# Patient Record
Sex: FEMALE
[De-identification: names, ages, dates, MRNs, and addresses within clinical notes are randomized per-mention and may not be internally consistent; named-entity substitution may affect disease eponyms.]

---

## 2017-01-26 ENCOUNTER — APPOINTMENT (OUTPATIENT)
Dept: POPULATION HEALTH | Facility: CLINIC | Age: 62
End: 2017-01-26

## 2017-01-26 VITALS
DIASTOLIC BLOOD PRESSURE: 68 MMHG | TEMPERATURE: 98.3 F | SYSTOLIC BLOOD PRESSURE: 98 MMHG | BODY MASS INDEX: 19.99 KG/M2 | OXYGEN SATURATION: 98 % | HEIGHT: 65 IN | WEIGHT: 120 LBS | HEART RATE: 85 BPM

## 2017-06-01 ENCOUNTER — APPOINTMENT (OUTPATIENT)
Dept: POPULATION HEALTH | Facility: CLINIC | Age: 62
End: 2017-06-01

## 2017-06-01 VITALS
HEART RATE: 81 BPM | SYSTOLIC BLOOD PRESSURE: 100 MMHG | WEIGHT: 117 LBS | OXYGEN SATURATION: 98 % | TEMPERATURE: 98 F | BODY MASS INDEX: 19.49 KG/M2 | DIASTOLIC BLOOD PRESSURE: 60 MMHG | HEIGHT: 65 IN

## 2017-11-02 ENCOUNTER — APPOINTMENT (OUTPATIENT)
Dept: POPULATION HEALTH | Facility: CLINIC | Age: 62
End: 2017-11-02
Payer: OTHER MISCELLANEOUS

## 2017-11-02 VITALS
HEIGHT: 65 IN | HEART RATE: 87 BPM | DIASTOLIC BLOOD PRESSURE: 60 MMHG | OXYGEN SATURATION: 98 % | SYSTOLIC BLOOD PRESSURE: 100 MMHG | BODY MASS INDEX: 19.99 KG/M2 | TEMPERATURE: 97.9 F | WEIGHT: 120 LBS

## 2017-11-02 PROCEDURE — 99213 OFFICE O/P EST LOW 20 MIN: CPT

## 2018-04-05 ENCOUNTER — APPOINTMENT (OUTPATIENT)
Dept: POPULATION HEALTH | Facility: CLINIC | Age: 63
End: 2018-04-05
Payer: OTHER MISCELLANEOUS

## 2018-04-05 VITALS
DIASTOLIC BLOOD PRESSURE: 66 MMHG | HEART RATE: 88 BPM | WEIGHT: 125 LBS | TEMPERATURE: 97.9 F | BODY MASS INDEX: 20.83 KG/M2 | HEIGHT: 65 IN | SYSTOLIC BLOOD PRESSURE: 99 MMHG | OXYGEN SATURATION: 98 %

## 2018-04-05 PROCEDURE — 99213 OFFICE O/P EST LOW 20 MIN: CPT

## 2018-09-20 ENCOUNTER — APPOINTMENT (OUTPATIENT)
Dept: POPULATION HEALTH | Facility: CLINIC | Age: 63
End: 2018-09-20
Payer: OTHER MISCELLANEOUS

## 2018-09-20 VITALS
TEMPERATURE: 97.8 F | OXYGEN SATURATION: 98 % | BODY MASS INDEX: 20.66 KG/M2 | DIASTOLIC BLOOD PRESSURE: 64 MMHG | SYSTOLIC BLOOD PRESSURE: 110 MMHG | HEIGHT: 65 IN | WEIGHT: 124 LBS | HEART RATE: 77 BPM

## 2018-09-20 PROCEDURE — 99214 OFFICE O/P EST MOD 30 MIN: CPT

## 2018-09-20 RX ORDER — AMOXICILLIN 875 MG/1
875 TABLET, FILM COATED ORAL
Qty: 14 | Refills: 0 | Status: COMPLETED | COMMUNITY
Start: 2018-09-12

## 2019-01-17 ENCOUNTER — APPOINTMENT (OUTPATIENT)
Dept: POPULATION HEALTH | Facility: CLINIC | Age: 64
End: 2019-01-17
Payer: OTHER MISCELLANEOUS

## 2019-01-17 VITALS
DIASTOLIC BLOOD PRESSURE: 73 MMHG | SYSTOLIC BLOOD PRESSURE: 113 MMHG | HEIGHT: 65 IN | BODY MASS INDEX: 20.66 KG/M2 | OXYGEN SATURATION: 97 % | WEIGHT: 124 LBS | TEMPERATURE: 97.4 F | HEART RATE: 84 BPM

## 2019-01-17 PROCEDURE — 99214 OFFICE O/P EST MOD 30 MIN: CPT

## 2019-05-16 ENCOUNTER — APPOINTMENT (OUTPATIENT)
Dept: POPULATION HEALTH | Facility: CLINIC | Age: 64
End: 2019-05-16
Payer: OTHER MISCELLANEOUS

## 2019-05-16 VITALS
DIASTOLIC BLOOD PRESSURE: 73 MMHG | HEIGHT: 65 IN | OXYGEN SATURATION: 98 % | SYSTOLIC BLOOD PRESSURE: 112 MMHG | TEMPERATURE: 98.3 F | HEART RATE: 80 BPM

## 2019-05-16 PROCEDURE — 99215 OFFICE O/P EST HI 40 MIN: CPT

## 2019-09-12 ENCOUNTER — APPOINTMENT (OUTPATIENT)
Dept: POPULATION HEALTH | Facility: CLINIC | Age: 64
End: 2019-09-12
Payer: OTHER MISCELLANEOUS

## 2019-09-12 VITALS
TEMPERATURE: 97.6 F | HEART RATE: 72 BPM | SYSTOLIC BLOOD PRESSURE: 104 MMHG | HEIGHT: 65 IN | OXYGEN SATURATION: 97 % | WEIGHT: 123 LBS | DIASTOLIC BLOOD PRESSURE: 66 MMHG | BODY MASS INDEX: 20.49 KG/M2

## 2019-09-12 PROCEDURE — 99214 OFFICE O/P EST MOD 30 MIN: CPT

## 2020-01-16 ENCOUNTER — APPOINTMENT (OUTPATIENT)
Dept: POPULATION HEALTH | Facility: CLINIC | Age: 65
End: 2020-01-16
Payer: OTHER MISCELLANEOUS

## 2020-01-16 VITALS
HEART RATE: 80 BPM | DIASTOLIC BLOOD PRESSURE: 74 MMHG | OXYGEN SATURATION: 96 % | HEIGHT: 65 IN | BODY MASS INDEX: 20.83 KG/M2 | TEMPERATURE: 97.4 F | SYSTOLIC BLOOD PRESSURE: 113 MMHG | WEIGHT: 125 LBS

## 2020-01-16 PROCEDURE — 99214 OFFICE O/P EST MOD 30 MIN: CPT

## 2020-06-18 ENCOUNTER — APPOINTMENT (OUTPATIENT)
Dept: POPULATION HEALTH | Facility: CLINIC | Age: 65
End: 2020-06-18
Payer: OTHER MISCELLANEOUS

## 2020-06-18 PROCEDURE — 99443: CPT

## 2020-10-15 ENCOUNTER — APPOINTMENT (OUTPATIENT)
Dept: POPULATION HEALTH | Facility: CLINIC | Age: 65
End: 2020-10-15
Payer: OTHER MISCELLANEOUS

## 2020-10-15 PROCEDURE — 99443: CPT

## 2020-10-15 NOTE — HISTORY OF PRESENT ILLNESS
[FreeTextEntry1] : We started this TELEPHONIC evaluation at 10:40 am and ended at 11:10 am.\par \par Ms Mast notes she has been having in person OT w/Dr. Thornton twice monthly and I gave her a new fax # to send the upgraded progress report which will be sent to our office after our evaluation.  She has had to make more frequent trips for groceries.  She has also bought a hands free vacuum .  She is trying to limit her use of hands in cleaning out papers which have piled up over the years due to her inability to perform these types of tasks and is having to clean them up and sort them due to the dust accumulation. She was advised to limit this type of work to 15 min's per day and take off 1 day per week by Dr. Thornton and I concur.  She has sporadic flare ups of tightness in her forearms along with pain in the same region associated with doing more work such as cleaning through her papers in her apartment.\par She does take public transportation to her OT sessions and we discussed briefly the protective measures en route. \par \par Assessment\par Symptomatic; refer to c-4.2 form for diagnoses\par \par Plan\par 1)  Review Dr. Thornton's OT report when received and scan into EHR\par 2)  Discussed additional preventive measures during this time of cleaning out papers when indoors due to pandemic\par 3)  Continue myofascial release, stretches and specify exercises from OT w/Dr. Thornton\par 4)  Re-eval in 10 wks

## 2020-12-17 ENCOUNTER — APPOINTMENT (OUTPATIENT)
Dept: POPULATION HEALTH | Facility: CLINIC | Age: 65
End: 2020-12-17
Payer: OTHER MISCELLANEOUS

## 2020-12-17 PROCEDURE — 99443: CPT

## 2020-12-17 NOTE — HISTORY OF PRESENT ILLNESS
[FreeTextEntry1] : We began this TELEPHONIC evaluation at 10 am and ended at 10:35 am.\par \par Work status: PPD\par \par Ms. Mast notes she has been cleaning papers and will work for 15 minutes in the morning on this project and then has to take an extended break until the afternoon when she will perform another second and last session of this cleaning the next day or sometimes in the afternoon of the same day, but usually the former.  She is feeling more resting pain which means she awakens being aware of the same pain in her arms that she had been experiencing the day before; only rarely does her arm pain awaken her from sleep, if there is an ongoing flare-up of symptoms.  She notes she is sitting more in front of the computer and tries to walk a half hour 5 or 6 days per week and 2 pilates and 1 dance class per week online. She has been using CBD cream (300 mg) and arnica extract on her forearms for pain relief and also takes biocurcumin orally.  260 mg of CBD in the new formulation. Discussed formulation with patient.  She also uses ice or epsom salts; she takes ciro orally and in her smoothie. \par \par PE\par Self palpation: medial epicondyle more tender than lateral on both arms\par Self observation: extensor forearm muscle pad: less volume on L c/w R forearm\par \par Assessment\par Symptomatic w/pain at rest after use during the day for short episodic tasks in ADLs; refer to C-4.2 for diagnoses\par \par Plan\par 1)  Rx for OT written 2x/month x 6 months; to be sent by fax to Dr. Anabel Thornton's office\par 2)  C-4.2 form to be completed\par 3)  Re-eval in 6 months

## 2021-05-20 ENCOUNTER — APPOINTMENT (OUTPATIENT)
Dept: POPULATION HEALTH | Facility: CLINIC | Age: 66
End: 2021-05-20
Payer: OTHER MISCELLANEOUS

## 2021-05-20 PROCEDURE — 99214 OFFICE O/P EST MOD 30 MIN: CPT

## 2021-05-20 PROCEDURE — 99072 ADDL SUPL MATRL&STAF TM PHE: CPT

## 2021-05-20 NOTE — HISTORY OF PRESENT ILLNESS
[FreeTextEntry1] : We began this TELEHEALTH evaluation at 5:20 pm and ended at 5:50 pm.\par \par Work status: working very part time 2.5 - 3 hrs/wk\par Last exam: Dec 16 2020\par \par Ms. Mast notes she obtains relief from her OT therapy w/Dr. Thornton.  She is cleaning her apartment and having to do more of her grocery shopping in person since she is doing more in neighborhood and less delivery after she has obtained her COVID-19 vaccines. Her therapy consists of myofascial release and stretches primarily.  Apart from deeting photos, she avoids computer use but if she writes emails, she uses voice dictation software which she had purchased. Her iphone has voice software. She uses her elbow splints nightly and its harder if its hot weather and she can't use them.  The pain in her forearms can keep her up at night a few times per week. To relief this, she applies ice, CD cream the following day, epsom salt baths.  She mostly uses her cell phone on speaker but sometimes holds it up to her head. \par \par Physical\par Reverse Phalen's test: pos. at 45 sec's L > R\par Wartenberg's sign; mild pos. on L , neg on R\par Self palpn: mild tenderness on L flexor forearm aspect, less on contralat side\par \par Assessment\par Symptomatic; refer to C-4.2 form for diagnoses\par \par Plan\par 1)  Advised substitute soft cotton towel instead of elbow splints in hot weather; advised physiology of ulnar nerve tethering and avoidance of sustained elbow flexion or splaying of digits\par 2)  OT Rx written\par 3)  C-4.2 form to be completed\par 4)  Re-eval in 6 months\par \par

## 2021-11-10 ENCOUNTER — APPOINTMENT (OUTPATIENT)
Dept: POPULATION HEALTH | Facility: CLINIC | Age: 66
End: 2021-11-10
Payer: OTHER MISCELLANEOUS

## 2021-11-10 PROCEDURE — 99215 OFFICE O/P EST HI 40 MIN: CPT | Mod: 95

## 2021-11-10 NOTE — HISTORY OF PRESENT ILLNESS
[FreeTextEntry1] : We began this TELEHEALTH evaluation at 10:07 am and ended at 11:02 am. \par \par Work status: working very part-time 2.5 - 3 hrs/wk\par Temp impairment: ppd\par Last exam:  May 20, 2021\par \par Ms. Mast reports she has been benefiting from OT which includes myofascial technique to the forearms with Dr. Anabel Thornton whose progress note from Nov 9, I reviewed and discussed w/Ms. Mast.  Dr. Thornton employs the Randell technique of myofascial release primarily, the pt notes.  Ms. Mast has been able to receive these treatments twice monthly since at least May of 2021.  She notes that when she experiences a flare-up of symptoms she drops objects more often but is not sure if this is related more to pain or weakness. She primarily relies on a robot vacuum for house cleaning though it sometimes malfunctions.  The pt also relies on frozen dinners but cooks once weekly relying on the same staple regimen throughout the week.  She is able to do laundry two to three times per week instead of once per month.  Last week on Nov 3, her bathroom sink backed up and flooded and taking things out of the bathroom resulted in increased manual tasks using her arms, including mopping the floor and rinsing the mop very quickly for about 20 minutes; even this relatively small increase in physical tasks has resulted in a flare up.  Her R hand which rinsed out the mop sponge was more symptomatic.  That night she felt pain in her forearms and the next day lasting about four days her forearms and hands were sore about twice as much she thinks and she therefore limited other activities as a result.  She notes that she can get groceries more often though carrying small loads and doing laundry more frequently.  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 \par \par Physical\par Wartenberg's test: neg bilat\par Self palpation: tenderness along lateral epicondyle L > R, approx 2 inches distal to epicondyle without radiating symptoms distally; point of maximal tenderness is more proximal to [place]. Pt notes there is stiffness in wrists but no radiating symptoms. \par JULY: osteophytic enlargement R thumb which pt notes is painful on full ABDuction of digits during Wartenberg's test.\par \par Assessment\par Symptomatic with benefits from OT MFR; refer to C-4.2 form for diagnoses\par \par Plan\par 1)  Advised topical arnica gel on lateral epicondyle regions daily\par 2)  C-4.2 form to be completed\par 3)  Re-eval in 6 months\par

## 2022-05-11 ENCOUNTER — APPOINTMENT (OUTPATIENT)
Dept: POPULATION HEALTH | Facility: CLINIC | Age: 67
End: 2022-05-11
Payer: OTHER MISCELLANEOUS

## 2022-05-11 PROCEDURE — 99215 OFFICE O/P EST HI 40 MIN: CPT | Mod: 95

## 2022-05-11 NOTE — ASSESSMENT
[Indicate if, in your opinion, the incident that the patient described was the competent medical cause of this injury/illness.] : The incident that the patient described was the competent medical cause of this injury/illness: Yes [Indicate if the patient's complaints are consistent with his/her history of the injury/illness.] : Indicate if the patient's complaints are consistent with his/her history of the injury/illness: Yes [Yes] : Yes, it is consistent [Are there any work limitations? (If so, explain and quantify, including the anticipated duration of the limitations)] : There are work limitations. [Can the patient return to usual work activities as indicated? If yes, indicate date___] : The patient cannot return to usual work activities as indicated. [FreeTextEntry5] : PPD [FreeTextEntry7] : Avoid prolonged static posture and repetitive and forceful tasks involving upper limbs.

## 2022-05-11 NOTE — PROCEDURE
[FreeTextEntry1] : We began this TELEHEALTH evaluation at 11:25 am and ended at 12:25 pm. 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 Mast has been having hands on therapy with Anabel Thornton MD whose report dated 5/10/2022 which I reviewed. and discussed with the pt.  The events that triggered her symptoms were reviewed a malfunction in her freezer which required moving items and a couple of days later her washing machine malfunctioned.  I recommended reviewing the neuropathycommons.org website and discussed it with the pt.  About 6 months ago Ms. Mast was noticing her hands were becoming more sensitive to the cold, even when they are wet and a draft goes over the area. Epsom salt baths have helped relieve some of her symptoms. \par \par Physical\par JULY: decreased muscle mass in forearms and interossei bilaterally; R thumb IP joint enlargement\par Tinel's at 4 or 5 cm distal to the medial epicondyle: no paresthesias\par Reverse Phalen's for 30 sec's: notes pulling and paresthesia in mid palmar aspect along 3rd digit and entire hand and forearm became numb, cold and pulling which included the 4th and 5th L > R.\par \par Assessment\par Symptomatic; refer to C-4.2 form for diagnoses\par \par Plan\par 1)  Rx for OT written 2x/month x 6 months to include teaching a core group of home exercises appropriate to pt's diagnoses\par 2)  Advised pt discuss the findings on neuropathycommons with Dr. Thornton and we will discuss potential additions to therapy next exam\par 3)  C-4.2 form to be completed\par 4)  Re-eval in 6 months.

## 2022-11-09 ENCOUNTER — APPOINTMENT (OUTPATIENT)
Dept: POPULATION HEALTH | Facility: CLINIC | Age: 67
End: 2022-11-09

## 2022-11-09 PROCEDURE — 99214 OFFICE O/P EST MOD 30 MIN: CPT | Mod: 95

## 2022-11-09 NOTE — HISTORY OF PRESENT ILLNESS
[Has the patient missed work because of the injury/illness?] : The patient has missed work because of the injury/illness. [No] : The patient is currently not working.

## 2022-11-09 NOTE — ASSESSMENT
[Indicate if, in your opinion, the incident that the patient described was the competent medical cause of this injury/illness.] : The incident that the patient described was the competent medical cause of this injury/illness: Yes [Indicate if the patient's complaints are consistent with his/her history of the injury/illness.] : Indicate if the patient's complaints are consistent with his/her history of the injury/illness: Yes [Yes] : Yes, it is consistent [FreeTextEntry5] : 80

## 2022-11-09 NOTE — PROCEDURE
[FreeTextEntry1] : We began this TELEHEALTH evaluation at 10:24 am and ended at 11 am.\par Ms. Mast gave her verbal consent for conducting this audio video encounter. She was located at her home and the examiner off-site.\par \par Work status: working 2.5 to 3 hrs/wk\par Temp impairment: approx. 80%\par Last exam: May 11, 2022\par \par Ms. Mast reports she has been receiving occupational therapy symptomatic treatment twice monthly which is helping her to function at her current level with the most difficulty in vacuuming, laundry and groceries.  She has bought a small robot vacuum and she uses a regular vacuum about twice yearly; she does laundry in small amounts once every other weeks, but the bed sheets every two to three weeks; she makes multiple trips for groceries and if she finds a friend who can drive and carry groceries she will take advantage of this to stock up; she has bought a small cart with wheels and pays store attendants to carry her groceries.  She has to plan for subway stops with elevators always.  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  \par \par Ms. Mast notes burning pain in her forearms and tingling in her hands when she overdoes manual activities in ADLs.  She is taking an antidepressant, duloxetine, that works on her pain threshold. Her forearm bone density mass is less than the other bones tested in her yearly DEXA scans. She practices Pilates technique. \par \par Physical\par Wartenberg's sign: neg, bilat but w/MCP flexion on R hand\par Flexion of elbows to 90 deg w/arms lifted up 90 deg: pt c/o numbness along ulnar aspect of both forearms, immediately on the R arm and after several seconds on the L arm. \par Self palpation of both forearms: on R pt notes some pain in the ulnar aspect of the forearm and on L forearm pt notes numbness along the L 3rd to 5th digits and some coldness of the fingers as well as some pain along the ulnar forearm. \par \par Assessment\par Symptomatic, stable; diagnoses unchanged\par \par Plan\par 1)  Rx for OT written\par 2)  Re-eval in 6 months\par \par \par

## 2023-05-10 ENCOUNTER — APPOINTMENT (OUTPATIENT)
Dept: POPULATION HEALTH | Facility: CLINIC | Age: 68
End: 2023-05-10
Payer: OTHER MISCELLANEOUS

## 2023-05-10 DIAGNOSIS — M67.90 UNSPECIFIED DISORDER OF SYNOVIUM AND TENDON, UNSPECIFIED SITE: ICD-10-CM

## 2023-05-10 PROCEDURE — 99215 OFFICE O/P EST HI 40 MIN: CPT | Mod: 95

## 2023-05-10 NOTE — PROCEDURE
[FreeTextEntry1] : We began this TELEHEALTH evaluation at 10:13 am and ended at 11:25 am. \par \par The patient consents to conduct this evaluation via audio and video software in the home/apt and the examiner is off site.\par \par Work status: working very part time, currently 2.5 hrs/wk\par Temp impairment: approx. 80%\par Last exam: Nov 9, 2022\par \par Ms. Mast reports she has been having a flare up consisting of increased pain in her forearms.  Her bone density scans recently showed her bone density has diminished and she is concerned due to her inability to use her upper limbs; she notes her arms feel "hollow and ache" and she has been taking generic Fosamax since the end of January.  Her T score for her L forearm is -3.3 which is classified as osteoporosis and slightly less lower scores in her L1-4 vertebrae, but still osteoporotic.  We discussed the implications of this finding at length. I reviewed the OT report from Dr. Thonrton dated 5/9/2023 and discussed w/the pt.  She admits to numbness in her 4th and 5th fingers but this mostly occurs only when raising her arms above her head.  She notes that carrying loads in groceries that she could carry without triggering pain in her forearms two months ago, now trigger pain and she is concerned. \par \par Physical\par Wartenberg's sign: neg, bilat but + MCP flexion on R on repeat testing\par (explained flexor tendon recruitment on R to pt who notes her ulnar related symptoms are more prominent on her L forearm, which I explained is consistent with sensory neuropathy).\par \par Assessment\par Recent forearm exacerbation of pain and previous to this onset of osteoporosis in forearms more than other bones tested; diagnoses unchanged\par \par Assessment\par Symptomatic exacerbation of forearm muscle and ulnar nerve injuries, with recently diagnosed forearm osteoporosis by DEXA scan with very likely contribution from underlying injury and its effects on upper arm use; diagnoses unchanged\par \par Plan\par 1)  Advised gradual increase in forearm and upper arm muscle activity using lime colored theraband and demonstrated specific exercises to be performed 3 X per day with 3 repetitions each session of 5 plantar and dorsi flexed movements and a 20 min rest in between repetitions. \par 2)  I will try to locate additional myofascial release practitioners with experience with RSI treatment who also accept work comp insurance\par 3)  Advised use of low level weights on body as tolerated in conjunction with her endocrine physician's oversight.\par 4)  Re-eval in 4 - 6 months.\par \par More than 50% of today's session consisted of education and counseling on medical diagnoses and management\par

## 2023-05-10 NOTE — ASSESSMENT
[Indicate if, in your opinion, the incident that the patient described was the competent medical cause of this injury/illness.] : The incident that the patient described was the competent medical cause of this injury/illness: Yes [Indicate if the patient's complaints are consistent with his/her history of the injury/illness.] : Indicate if the patient's complaints are consistent with his/her history of the injury/illness: Yes [Yes] : Yes, it is consistent [Are there any work limitations? (If so, explain and quantify, including the anticipated duration of the limitations)] : There are work limitations. [Can the patient return to usual work activities as indicated? If yes, indicate date___] : The patient cannot return to usual work activities as indicated. [FreeTextEntry5] : 80 [FreeTextEntry6] : Refer to documents in file.  [FreeTextEntry7] : On going major limitations in work capacity due to chronic injuries affecting upper extremity motor functions even in ADLs.

## 2023-05-10 NOTE — HISTORY OF PRESENT ILLNESS
[Has the patient missed work because of the injury/illness?] : The patient has missed work because of the injury/illness. [Yes] : The patient is currently working. [FreeTextEntry7] : very limited, part -time, currently 2.5 hrs/wk

## 2023-05-10 NOTE — PLAN
[FreeTextEntry1] : Refer to MD note.  [FreeTextEntry2] : n/a [FreeTextEntry3] : Guarded.  [FreeTextEntry4] : 4 - 6 months

## 2023-11-08 ENCOUNTER — APPOINTMENT (OUTPATIENT)
Age: 68
End: 2023-11-08
Payer: OTHER MISCELLANEOUS

## 2023-11-08 ENCOUNTER — APPOINTMENT (OUTPATIENT)
Dept: POPULATION HEALTH | Facility: CLINIC | Age: 68
End: 2023-11-08

## 2023-11-08 PROCEDURE — 99215 OFFICE O/P EST HI 40 MIN: CPT | Mod: 95

## 2024-05-08 ENCOUNTER — APPOINTMENT (OUTPATIENT)
Age: 69
End: 2024-05-08
Payer: OTHER MISCELLANEOUS

## 2024-05-08 DIAGNOSIS — M77.01 MEDIAL EPICONDYLITIS, RIGHT ELBOW: ICD-10-CM

## 2024-05-08 DIAGNOSIS — M77.12 LATERAL EPICONDYLITIS, RIGHT ELBOW: ICD-10-CM

## 2024-05-08 DIAGNOSIS — M77.11 LATERAL EPICONDYLITIS, RIGHT ELBOW: ICD-10-CM

## 2024-05-08 DIAGNOSIS — G56.20 LESION OF ULNAR NERVE, UNSPECIFIED UPPER LIMB: ICD-10-CM

## 2024-05-08 DIAGNOSIS — M77.02 MEDIAL EPICONDYLITIS, RIGHT ELBOW: ICD-10-CM

## 2024-05-08 PROCEDURE — 99214 OFFICE O/P EST MOD 30 MIN: CPT

## 2024-05-09 NOTE — PROCEDURE
[FreeTextEntry1] : We began this TELEHEALTH evaluation at 10:10 am and ended at 10:45 am.   Work status: working very part time Temp impairment: 25% [employer] Last exam: Nov 8, 2023  Ms. Mast has been receiving occupational therapy and we reviewed the OT progress report, from which she read, that documented the benefit of regular myofascial release to allow her the ability to recover from upper extremity pain while doing her ADLs and to be able to continue performing her ADLs, which are already limited and vastly modified.  However, as to the report, the pt notes she is not using lidocaine patches. I advised that she would only use them as a stop gap pain control measure.  She uses a pulsating electromagnetic field therapy device for her forearms.  She is also taking an injection subcutaneously to strengthen her bone (an anabolic medication) and the electromagnetic device is supposed to help the microcirculation in her bone.  And her endocrinologist did notice improvements in her bone health, the pt notes.  Ms. Mast has been engaging regularly in Pilates classes, MELT method class, walking, stretches taught by Dr. Thornton, the OTherapist and as previously documented, advised limiting and pacing her ADLs that involve significant upper extremity use.    Physical Wrist flexion: PROM: R = L = 45-60 deg; AROM: R = 90 deg, L = 80-90 deg Reverse Phalen's test: + L hand at 30 sec's; R , w/shoulder lifted to chest level, + at 5 sec's Wartenberg's sign: neg, bilat.  Assessment Symptomatic; receiving regular OT including MFRelease; diagnoses unchanged  Plan 1). Rx to be sent for OT w/ Dr. Thornton 2). Re-eval in 6 months.  I personally spent 35 minutes in total today in the care of this patient.

## 2024-05-09 NOTE — HISTORY OF PRESENT ILLNESS
[Has the patient missed work because of the injury/illness?] : The patient has missed work because of the injury/illness. [Yes] : The patient is currently working. [FreeTextEntry7] : very part time work as noted in prior notes.

## 2024-05-09 NOTE — PLAN
[FreeTextEntry1] : Refe to MD note.  [FreeTextEntry2] : n/a [FreeTextEntry3] : Limited. [FreeTextEntry4] : 6 months.

## 2024-05-09 NOTE — ASSESSMENT
[Indicate if, in your opinion, the incident that the patient described was the competent medical cause of this injury/illness.] : The incident that the patient described was the competent medical cause of this injury/illness: Yes [Indicate if the patient's complaints are consistent with his/her history of the injury/illness.] : Indicate if the patient's complaints are consistent with his/her history of the injury/illness: Yes [Yes] : Yes, it is consistent [Are there any work limitations? (If so, explain and quantify, including the anticipated duration of the limitations)] : There are work limitations. [Can the patient return to usual work activities as indicated? If yes, indicate date___] : The patient cannot return to usual work activities as indicated. [FreeTextEntry5] : [employer] 25% [FreeTextEntry6] : Refer to documents in file.  [FreeTextEntry7] : Markedly limited use of upper limbs for motor tasks.

## 2024-08-14 ENCOUNTER — APPOINTMENT (OUTPATIENT)
Age: 69
End: 2024-08-14
Payer: OTHER MISCELLANEOUS

## 2024-08-14 VITALS
TEMPERATURE: 97.8 F | RESPIRATION RATE: 17 BRPM | DIASTOLIC BLOOD PRESSURE: 72 MMHG | SYSTOLIC BLOOD PRESSURE: 140 MMHG | WEIGHT: 124 LBS | BODY MASS INDEX: 20.66 KG/M2 | OXYGEN SATURATION: 96 % | HEIGHT: 65 IN | HEART RATE: 84 BPM

## 2024-08-14 DIAGNOSIS — M77.11 LATERAL EPICONDYLITIS, RIGHT ELBOW: ICD-10-CM

## 2024-08-14 DIAGNOSIS — M77.02 MEDIAL EPICONDYLITIS, RIGHT ELBOW: ICD-10-CM

## 2024-08-14 DIAGNOSIS — G56.20 LESION OF ULNAR NERVE, UNSPECIFIED UPPER LIMB: ICD-10-CM

## 2024-08-14 DIAGNOSIS — M67.90 UNSPECIFIED DISORDER OF SYNOVIUM AND TENDON, UNSPECIFIED SITE: ICD-10-CM

## 2024-08-14 DIAGNOSIS — M77.01 MEDIAL EPICONDYLITIS, RIGHT ELBOW: ICD-10-CM

## 2024-08-14 DIAGNOSIS — M77.12 LATERAL EPICONDYLITIS, RIGHT ELBOW: ICD-10-CM

## 2024-08-14 PROCEDURE — 99215 OFFICE O/P EST HI 40 MIN: CPT

## 2024-08-14 NOTE — PLAN
[FreeTextEntry1] : Refer to MD note.  [FreeTextEntry2] : n/a [FreeTextEntry3] : Permanent disability. [FreeTextEntry4] : 6 months

## 2024-08-14 NOTE — PROCEDURE
[FreeTextEntry1] : We began this IN PERSON at 10:15 am and ended at 11:00 am.   Work stauts; working very part time 32.5 hrs /wk Temp impairment: 25% [employer] Last exam May 8, 2024  Ms. Mast's most recent OT reports with Dr. Thornton from Aug 8 and 13 were reviewed and discussed w/the pt.  She is not using lidoderm patches.  I have outlined in my prior note of May 8, her self-management protocol and this has not changed except that she is walking more and her osteoporosis medication had been changed to an anabolic medication; she is able to walk more since taking this anabolic medication injected subcutaneously.  During the day she is performing about 1.5 hrs of various PT exercises due to a Nov 2022 fracture of her L patella and in May of 2023 she tripped over a tree root and had a fall on both her hands which fractured the trapezium and a few smaller bones in both of her wrists and hands and she also sustained a torn cartilage in her R shoulder from that same fall.  She had to be very careful to not flare up her forearm and elbow symptoms.  In January 2024, she developed a stress fracture of the second MT of her R foot and was immobilized in a boot for 1.5 months.  She finished the PT for that stress fracture in second week of July 2024.  She uses voice activated software for computer correspondence regularly.    Physical Pt is R dominant Hands: warm to touch, no hyperhidrosis or mottling, bilat. Wartenberg's sign: neg, bilat. Carrying Angle: R = 10 deg, L = 15 deg  strength: J dynamometer, pos 2: R = 55 / 55 / 46 #,  L = 52 / 45 / 42 # Pinch : R = 5 # x 3 reps, L = 4.8 / 4 / 4.5 # Palpn: Tender R lat epicondyle and proximal forearm flexor aspect at area of muscle knot, only mildly present on contralat side. R forearm: muscle mass slightly greater on JULY c/w L forearm Hands: thenar pad no significant atrophy CMC joints: slightly larger on R c/w L  Assessment Symptomatic but stable w/ongoing OT MFR and self management measures  Plan 1). Rx for OT written w/Dr. BERT Thornton 2). Re-eval in 6 months  I spent a total of 45 minutes today in the care of this patient.

## 2024-08-14 NOTE — ASSESSMENT
[Indicate if, in your opinion, the incident that the patient described was the competent medical cause of this injury/illness.] : The incident that the patient described was the competent medical cause of this injury/illness: Yes [Indicate if the patient's complaints are consistent with his/her history of the injury/illness.] : Indicate if the patient's complaints are consistent with his/her history of the injury/illness: Yes [Yes] : Yes, it is consistent [Are there any work limitations? (If so, explain and quantify, including the anticipated duration of the limitations)] : There are work limitations. [Can the patient return to usual work activities as indicated? If yes, indicate date___] : The patient cannot return to usual work activities as indicated. [FreeTextEntry5] : 25% [employer] [FreeTextEntry6] : Refer to documents in file.  [FreeTextEntry7] : Refer to documents in file.

## 2024-08-14 NOTE — HISTORY OF PRESENT ILLNESS
[Has the patient missed work because of the injury/illness?] : The patient has missed work because of the injury/illness. [Yes] : The patient is currently working. [FreeTextEntry7] : Works very part time 2.5 hrs/wk

## 2025-01-15 ENCOUNTER — APPOINTMENT (OUTPATIENT)
Age: 70
End: 2025-01-15

## 2025-01-15 DIAGNOSIS — M67.98 UNSPECIFIED DISORDER OF SYNOVIUM AND TENDON, OTHER SITE: ICD-10-CM

## 2025-01-15 DIAGNOSIS — M77.01 MEDIAL EPICONDYLITIS, RIGHT ELBOW: ICD-10-CM

## 2025-01-15 DIAGNOSIS — M77.02 MEDIAL EPICONDYLITIS, RIGHT ELBOW: ICD-10-CM

## 2025-01-15 DIAGNOSIS — M77.12 LATERAL EPICONDYLITIS, RIGHT ELBOW: ICD-10-CM

## 2025-01-15 DIAGNOSIS — G56.20 LESION OF ULNAR NERVE, UNSPECIFIED UPPER LIMB: ICD-10-CM

## 2025-01-15 DIAGNOSIS — M77.11 LATERAL EPICONDYLITIS, RIGHT ELBOW: ICD-10-CM

## 2025-01-15 PROCEDURE — 99215 OFFICE O/P EST HI 40 MIN: CPT | Mod: 95

## 2025-07-23 ENCOUNTER — APPOINTMENT (OUTPATIENT)
Age: 70
End: 2025-07-23
Payer: OTHER MISCELLANEOUS

## 2025-07-23 ENCOUNTER — NON-APPOINTMENT (OUTPATIENT)
Age: 70
End: 2025-07-23

## 2025-07-23 VITALS
OXYGEN SATURATION: 97 % | DIASTOLIC BLOOD PRESSURE: 60 MMHG | HEIGHT: 64 IN | TEMPERATURE: 98.6 F | BODY MASS INDEX: 21.17 KG/M2 | RESPIRATION RATE: 18 BRPM | HEART RATE: 76 BPM | WEIGHT: 124 LBS | SYSTOLIC BLOOD PRESSURE: 112 MMHG

## 2025-07-23 DIAGNOSIS — M77.02 MEDIAL EPICONDYLITIS, RIGHT ELBOW: ICD-10-CM

## 2025-07-23 DIAGNOSIS — M67.98 UNSPECIFIED DISORDER OF SYNOVIUM AND TENDON, OTHER SITE: ICD-10-CM

## 2025-07-23 DIAGNOSIS — M77.11 LATERAL EPICONDYLITIS, RIGHT ELBOW: ICD-10-CM

## 2025-07-23 DIAGNOSIS — M77.01 MEDIAL EPICONDYLITIS, RIGHT ELBOW: ICD-10-CM

## 2025-07-23 DIAGNOSIS — G56.20 LESION OF ULNAR NERVE, UNSPECIFIED UPPER LIMB: ICD-10-CM

## 2025-07-23 DIAGNOSIS — M77.12 LATERAL EPICONDYLITIS, RIGHT ELBOW: ICD-10-CM

## 2025-07-23 PROCEDURE — 99214 OFFICE O/P EST MOD 30 MIN: CPT
